# Patient Record
Sex: MALE
[De-identification: names, ages, dates, MRNs, and addresses within clinical notes are randomized per-mention and may not be internally consistent; named-entity substitution may affect disease eponyms.]

---

## 2021-04-07 ENCOUNTER — APPOINTMENT (OUTPATIENT)
Age: 36
End: 2021-04-07
Payer: COMMERCIAL

## 2021-04-07 PROCEDURE — 0001A: CPT

## 2021-04-28 ENCOUNTER — APPOINTMENT (OUTPATIENT)
Age: 36
End: 2021-04-28
Payer: COMMERCIAL

## 2021-04-28 PROCEDURE — 0002A: CPT

## 2024-04-24 ENCOUNTER — APPOINTMENT (OUTPATIENT)
Dept: ORTHOPEDIC SURGERY | Facility: CLINIC | Age: 39
End: 2024-04-24
Payer: COMMERCIAL

## 2024-04-24 VITALS — BODY MASS INDEX: 27.09 KG/M2 | WEIGHT: 200 LBS | HEIGHT: 72 IN

## 2024-04-24 DIAGNOSIS — M25.372 OTHER INSTABILITY, LEFT ANKLE: ICD-10-CM

## 2024-04-24 PROBLEM — Z00.00 ENCOUNTER FOR PREVENTIVE HEALTH EXAMINATION: Status: ACTIVE | Noted: 2024-04-24

## 2024-04-24 PROCEDURE — 99203 OFFICE O/P NEW LOW 30 MIN: CPT

## 2024-04-24 PROCEDURE — 73630 X-RAY EXAM OF FOOT: CPT | Mod: LT

## 2024-04-24 RX ORDER — NABUMETONE 500 MG/1
500 TABLET, FILM COATED ORAL
Qty: 60 | Refills: 2 | Status: ACTIVE | COMMUNITY
Start: 2024-04-24 | End: 1900-01-01

## 2024-04-24 NOTE — ASSESSMENT
[FreeTextEntry1] : Reviewed at length with patient exam history and imaging as well as moderate instability and treatment options at this time patient elects home exercises as well as physical therapy program and if no significant improvement after 6 weeks ultimately consideration MRI evaluation with possible ankle stabilization

## 2024-04-24 NOTE — PHYSICAL EXAM
[de-identified] : Left ankle  Constitutional:  The patient is healthy-appearing and in no apparent distress.   Gait and Station:  The patient ambulates with a normal gait and no limp.   Cardiovascular System:  The capillary refill is less than 2 seconds.   Skin:  There are no skin abnormalities of ankle.  Ankles and Feet:  Inspection:  There is no erythema. There is no induration. There is no warmth. There is no deformity.   There is no swelling.   Bony Palpation:  There is no tenderness of the calcaneal tuberosity. There is no tenderness of the metatarsals. There is no tenderness of the tarsometatarsal joints There is no tenderness of the navicular tuberosity.  There is no tenderness of the dome of talus. There is no tenderness of the head of talus. There is no tenderness of the inferior tibiofibular joint.  Soft Tissue Palpation:  There is no tenderness of the tibialis posterior. There is no tenderness of the tibialis anterior.  There is no tenderness of the plantar fascia. There is no tenderness of the Achilles tendon. There is no tenderness of the extensor hallucis longus. There is no tenderness of the sinus tarsi.  There is no tenderness of the peroneus longus and brevis. There is no tenderness of the deltoid ligament.    There is no tenderness of the anterior talofibular ligament and the calcaneofibular ligament.   Active Range of Motion:  The range of motion at the ankle is full.   Stability:  The anterior drawer is 2+  Strength:  There is 5/5 ankle plantarflexion and dorsiflexion.  Neurological System:  There is normal sensation to light touch at the ankle and foot.   Psychiatric:  The patient demonstrates a normal mood and affect and is active and alert.  [de-identified] : X-ray left foot: There is no significant bony / soft tissue abnormality, arthritis, or fracture.

## 2024-04-24 NOTE — HISTORY OF PRESENT ILLNESS
[de-identified] : Left ankle  Duration: 1 week  Prior studies: x rays ordered  Symptoms weakness Aggravating: walking  Alleviating: resting / icing/  Pain level: 0/10 Pain medication: Ibuprofen  Medical Hx: none  Surgical Hx: none Current Medication: Allergies: NKA

## 2024-06-28 ENCOUNTER — TRANSCRIPTION ENCOUNTER (OUTPATIENT)
Age: 39
End: 2024-06-28